# Patient Record
Sex: FEMALE | Race: BLACK OR AFRICAN AMERICAN | NOT HISPANIC OR LATINO
[De-identification: names, ages, dates, MRNs, and addresses within clinical notes are randomized per-mention and may not be internally consistent; named-entity substitution may affect disease eponyms.]

---

## 2018-04-06 ENCOUNTER — APPOINTMENT (OUTPATIENT)
Dept: ORTHOPEDIC SURGERY | Facility: CLINIC | Age: 83
End: 2018-04-06

## 2019-12-09 ENCOUNTER — INPATIENT (INPATIENT)
Facility: HOSPITAL | Age: 84
LOS: 1 days | Discharge: HOME | DRG: 313 | End: 2019-12-11
Attending: INTERNAL MEDICINE | Admitting: INTERNAL MEDICINE
Payer: COMMERCIAL

## 2019-12-09 VITALS — TEMPERATURE: 99 F | WEIGHT: 149.91 LBS | OXYGEN SATURATION: 98 % | HEART RATE: 75 BPM | RESPIRATION RATE: 16 BRPM

## 2019-12-09 DIAGNOSIS — R07.9 CHEST PAIN, UNSPECIFIED: ICD-10-CM

## 2019-12-09 LAB
ALBUMIN SERPL ELPH-MCNC: 3.8 G/DL — SIGNIFICANT CHANGE UP (ref 3.5–5)
ALP SERPL-CCNC: 46 U/L — SIGNIFICANT CHANGE UP (ref 40–120)
ALT FLD-CCNC: 22 U/L DA — SIGNIFICANT CHANGE UP (ref 10–60)
ANION GAP SERPL CALC-SCNC: 6 MMOL/L — SIGNIFICANT CHANGE UP (ref 5–17)
AST SERPL-CCNC: 16 U/L — SIGNIFICANT CHANGE UP (ref 10–40)
BILIRUB SERPL-MCNC: 0.3 MG/DL — SIGNIFICANT CHANGE UP (ref 0.2–1.2)
BUN SERPL-MCNC: 13 MG/DL — SIGNIFICANT CHANGE UP (ref 7–18)
CALCIUM SERPL-MCNC: 9 MG/DL — SIGNIFICANT CHANGE UP (ref 8.4–10.5)
CHLORIDE SERPL-SCNC: 108 MMOL/L — SIGNIFICANT CHANGE UP (ref 96–108)
CO2 SERPL-SCNC: 25 MMOL/L — SIGNIFICANT CHANGE UP (ref 22–31)
CREAT SERPL-MCNC: 1.03 MG/DL — SIGNIFICANT CHANGE UP (ref 0.5–1.3)
GLUCOSE SERPL-MCNC: 99 MG/DL — SIGNIFICANT CHANGE UP (ref 70–99)
HCT VFR BLD CALC: 37.2 % — SIGNIFICANT CHANGE UP (ref 34.5–45)
HGB BLD-MCNC: 11.9 G/DL — SIGNIFICANT CHANGE UP (ref 11.5–15.5)
MCHC RBC-ENTMCNC: 30.7 PG — SIGNIFICANT CHANGE UP (ref 27–34)
MCHC RBC-ENTMCNC: 32 GM/DL — SIGNIFICANT CHANGE UP (ref 32–36)
MCV RBC AUTO: 95.9 FL — SIGNIFICANT CHANGE UP (ref 80–100)
NRBC # BLD: 0 /100 WBCS — SIGNIFICANT CHANGE UP (ref 0–0)
NT-PROBNP SERPL-SCNC: 266 PG/ML — SIGNIFICANT CHANGE UP (ref 0–450)
PLATELET # BLD AUTO: 300 K/UL — SIGNIFICANT CHANGE UP (ref 150–400)
POTASSIUM SERPL-MCNC: 4.1 MMOL/L — SIGNIFICANT CHANGE UP (ref 3.5–5.3)
POTASSIUM SERPL-SCNC: 4.1 MMOL/L — SIGNIFICANT CHANGE UP (ref 3.5–5.3)
PROT SERPL-MCNC: 7.2 G/DL — SIGNIFICANT CHANGE UP (ref 6–8.3)
RBC # BLD: 3.88 M/UL — SIGNIFICANT CHANGE UP (ref 3.8–5.2)
RBC # FLD: 14.5 % — SIGNIFICANT CHANGE UP (ref 10.3–14.5)
SODIUM SERPL-SCNC: 139 MMOL/L — SIGNIFICANT CHANGE UP (ref 135–145)
TROPONIN I SERPL-MCNC: <0.015 NG/ML — SIGNIFICANT CHANGE UP (ref 0–0.04)
TROPONIN I SERPL-MCNC: <0.015 NG/ML — SIGNIFICANT CHANGE UP (ref 0–0.04)
WBC # BLD: 8.44 K/UL — SIGNIFICANT CHANGE UP (ref 3.8–10.5)
WBC # FLD AUTO: 8.44 K/UL — SIGNIFICANT CHANGE UP (ref 3.8–10.5)

## 2019-12-09 PROCEDURE — 99285 EMERGENCY DEPT VISIT HI MDM: CPT

## 2019-12-09 PROCEDURE — 71046 X-RAY EXAM CHEST 2 VIEWS: CPT | Mod: 26

## 2019-12-09 PROCEDURE — 99222 1ST HOSP IP/OBS MODERATE 55: CPT

## 2019-12-09 NOTE — H&P ADULT - NSHPPHYSICALEXAM_GEN_ALL_CORE
Vital Signs Last 24 Hrs  T(C): 36.6 (10 Dec 2019 05:20), Max: 37.5 (09 Dec 2019 19:51)  T(F): 97.9 (10 Dec 2019 05:20), Max: 99.5 (09 Dec 2019 19:51)  HR: 58 (10 Dec 2019 05:20) (58 - 75)  BP: 118/53 (10 Dec 2019 05:20) (118/53 - 142/67)  BP(mean): --  RR: 16 (10 Dec 2019 05:20) (16 - 16)  SpO2: 100% (10 Dec 2019 05:20) (98% - 100%)

## 2019-12-09 NOTE — H&P ADULT - PROBLEM SELECTOR PLAN 1
p/w atypical, bilateral chest pain  EKG NSR, trop negative x2  f/u t3  tele monitoring  f/u TTE  starting asa, statin, bblocker  cardio consulted - Dr. Hurley

## 2019-12-09 NOTE — H&P ADULT - HISTORY OF PRESENT ILLNESS
87 y/o female with PMHx of HTN, spinal stenosis and hypothyroidism presents to the ED with chief complaint of chest pain that began this morning. She describes the pain as constant, bilateral and improved at present. At its worst, she says it was 9/10 in severity. She is unable to provide further descriptives of the pain itself. She does say that she thinks its pleuritic. She also complaints of occasional shortness of breath even at rest. She has no other complaints at this time.

## 2019-12-09 NOTE — H&P ADULT - ASSESSMENT
85 y/o female admitted to tele for chest pain with concern for ACS.    Patient cannot recall home meds. Primary team to call pharmacy for med rec.

## 2019-12-09 NOTE — H&P ADULT - ATTENDING COMMENTS
Pt seen and examined at bedside. Discuss with housestaff.    86 year old woman with PMH of HTN, previous work up for CAD after an abnormal stress test with cardiac cath (11/2016) showing  "VENTRICLES: EF estimated was 60 %.CORONARY VESSELS: The coronary circulation is left dominant.  LM:   --  LM: Normal.  LAD:   --  LAD: Normal.  --  D1: Normal.  --  D2: Normal.  CX:   --  Circumflex: Normal.  --  OM1: Normal.  RCA:   --  RCA: Normal.    She has been managed with aggressive medical therapy since then. She comes in today with CP    Vital Signs Last 24 Hrs  T(C): 37.5 (09 Dec 2019 19:51), Max: 37.5 (09 Dec 2019 19:51)  T(F): 99.5 (09 Dec 2019 19:51), Max: 99.5 (09 Dec 2019 19:51)  HR: 63 (09 Dec 2019 19:51) (63 - 75)  BP: 135/60 (09 Dec 2019 19:51) (135/60 - 135/60)  RR: 16 (09 Dec 2019 19:51) (16 - 16)  SpO2: 99% (09 Dec 2019 19:51) (98% - 99%) Pt seen and examined at bedside. Discuss with housestaff.    86 year old woman with PMH of HTN, previous work up for CAD after an abnormal stress test with cardiac cath (11/2016) showing normal exam with recommendations for medical management. She presents with a few days of chest pain allover the right and left chest. There is no referral. Associated progressive SOB with exercise intolerance. Unable to really detail this as it occurs in the background of chronic low back pain and B/L knee osteoarthritis. No orthopnea or PND.    Vital Signs Last 24 Hrs  T(C): 36.9 (10 Dec 2019 00:39), Max: 37.5 (09 Dec 2019 19:51)  T(F): 98.4 (10 Dec 2019 00:39), Max: 99.5 (09 Dec 2019 19:51)  HR: 63 (10 Dec 2019 00:39) (63 - 75)  BP: 142/67 (10 Dec 2019 00:39) (135/60 - 142/67)  RR: 16 (10 Dec 2019 00:39) (16 - 16)  SpO2: 100% (10 Dec 2019 00:39) (98% - 100%)    Elderly woman, lying in bed, NAD AAO X 3  CTA B/L RRR S1S2 only  CP is not reproducible  Soft NT ND BS +  No pedal edema, no focal deficits    Labs                        11.9   8.44  )-----------( 300      ( 09 Dec 2019 17:25 )             37.2     12-09    139  |  108  |  13  -----------------------<  99  4.1   |  25  |  1.03    Ca    9.0      09 Dec 2019 17:25    TPro  7.2  /  Alb  3.8  /  TBili  0.3  /  DBili  x   /  AST  16  /  ALT  22  /  AlkPhos  46  12-09    CARDIAC MARKERS ( 09 Dec 2019 21:04 )  <0.015 ng/mL / x     / x     / x     / x      CARDIAC MARKERS ( 09 Dec 2019 17:25 )  <0.015 ng/mL / x     / x     / x     / x        EKG - unremarkable  OLD cath report  "VENTRICLES: EF estimated was 60 %.CORONARY VESSELS: The coronary circulation is left dominant.  LM:   --  LM: Normal.  LAD:   --  LAD: Normal.  --  D1: Normal.  --  D2: Normal.  CX:   --  Circumflex: Normal.  --  OM1: Normal.  RCA:   --  RCA: Normal.  She has been managed with aggressive medical therapy since then.    Impression  86 year old woman with PMH as above presenting with atypical CP and progressive SOB.  WIll admit for ACS r/o and CHF work up.  Low HEART and SOLE score.    A/P  R/O ACS  CHF work up    Plan  Admit to tele  Serial cardiac enzymes  EKG  ASA/BB/ASA  Stress test  Cardiology consult

## 2019-12-09 NOTE — ED ADULT NURSE NOTE - NSIMPLEMENTINTERV_GEN_ALL_ED
Implemented All Fall Risk Interventions:  [place] to call system. Call bell, personal items and telephone within reach. Instruct patient to call for assistance. Room bathroom lighting operational. Non-slip footwear when patient is off stretcher. Physically safe environment: no spills, clutter or unnecessary equipment. Stretcher in lowest position, wheels locked, appropriate side rails in place. Provide visual cue, wrist band, yellow gown, etc. Monitor gait and stability. Monitor for mental status changes and reorient to person, place, and time. Review medications for side effects contributing to fall risk. Reinforce activity limits and safety measures with patient and family.

## 2019-12-09 NOTE — ED ADULT NURSE NOTE - OBJECTIVE STATEMENT
Pt BIBA after complaints of chest pain.   Pt states she has pain while breathing that started today/AM. Pain scale 5/10

## 2019-12-09 NOTE — ED PROVIDER NOTE - OBJECTIVE STATEMENT
86 year old female with PMHx of hypertension, hyperlipidemia, and hypothyroidism and no pertinent PSHx presents to the ED with complaints of bilateral chest pain and trouble breathing for a few hours since this morning. Patient reports that the chest pain does not radiate to her arms, and is worsened with lying flat. Patient states that she has associated shortness of breath with her chest pain. Patient otherwise denies any diaphoresis, nausea, light-headedness, swelling, and all other acute complaints. Patient reports that she does not have any family history of similar symptoms, and does not have any personal history of blood clots, cancer, MI, or strokes. NKDA.

## 2019-12-09 NOTE — ED ADULT NURSE NOTE - CHPI ED NUR SYMPTOMS NEG
no dizziness/no fever/no weakness/no tingling/no vomiting/no decreased eating/drinking/no nausea/no chills

## 2019-12-10 DIAGNOSIS — E03.9 HYPOTHYROIDISM, UNSPECIFIED: ICD-10-CM

## 2019-12-10 DIAGNOSIS — I10 ESSENTIAL (PRIMARY) HYPERTENSION: ICD-10-CM

## 2019-12-10 DIAGNOSIS — Z29.9 ENCOUNTER FOR PROPHYLACTIC MEASURES, UNSPECIFIED: ICD-10-CM

## 2019-12-10 DIAGNOSIS — R07.9 CHEST PAIN, UNSPECIFIED: ICD-10-CM

## 2019-12-10 LAB
ALBUMIN SERPL ELPH-MCNC: 3.4 G/DL — LOW (ref 3.5–5)
ALP SERPL-CCNC: 39 U/L — LOW (ref 40–120)
ALT FLD-CCNC: 24 U/L DA — SIGNIFICANT CHANGE UP (ref 10–60)
ANION GAP SERPL CALC-SCNC: 4 MMOL/L — LOW (ref 5–17)
AST SERPL-CCNC: 16 U/L — SIGNIFICANT CHANGE UP (ref 10–40)
BASOPHILS # BLD AUTO: 0.05 K/UL — SIGNIFICANT CHANGE UP (ref 0–0.2)
BASOPHILS NFR BLD AUTO: 0.7 % — SIGNIFICANT CHANGE UP (ref 0–2)
BILIRUB SERPL-MCNC: 0.4 MG/DL — SIGNIFICANT CHANGE UP (ref 0.2–1.2)
BUN SERPL-MCNC: 12 MG/DL — SIGNIFICANT CHANGE UP (ref 7–18)
CALCIUM SERPL-MCNC: 9 MG/DL — SIGNIFICANT CHANGE UP (ref 8.4–10.5)
CHLORIDE SERPL-SCNC: 111 MMOL/L — HIGH (ref 96–108)
CHOLEST SERPL-MCNC: 173 MG/DL — SIGNIFICANT CHANGE UP (ref 10–199)
CO2 SERPL-SCNC: 27 MMOL/L — SIGNIFICANT CHANGE UP (ref 22–31)
CREAT SERPL-MCNC: 0.86 MG/DL — SIGNIFICANT CHANGE UP (ref 0.5–1.3)
EOSINOPHIL # BLD AUTO: 0.03 K/UL — SIGNIFICANT CHANGE UP (ref 0–0.5)
EOSINOPHIL NFR BLD AUTO: 0.4 % — SIGNIFICANT CHANGE UP (ref 0–6)
FOLATE SERPL-MCNC: 20 NG/ML — SIGNIFICANT CHANGE UP
GLUCOSE SERPL-MCNC: 100 MG/DL — HIGH (ref 70–99)
HBA1C BLD-MCNC: 5.9 % — HIGH (ref 4–5.6)
HCT VFR BLD CALC: 37.1 % — SIGNIFICANT CHANGE UP (ref 34.5–45)
HDLC SERPL-MCNC: 79 MG/DL — SIGNIFICANT CHANGE UP
HGB BLD-MCNC: 11.8 G/DL — SIGNIFICANT CHANGE UP (ref 11.5–15.5)
IMM GRANULOCYTES NFR BLD AUTO: 0.7 % — SIGNIFICANT CHANGE UP (ref 0–1.5)
LIPID PNL WITH DIRECT LDL SERPL: 78 MG/DL — SIGNIFICANT CHANGE UP
LYMPHOCYTES # BLD AUTO: 1.79 K/UL — SIGNIFICANT CHANGE UP (ref 1–3.3)
LYMPHOCYTES # BLD AUTO: 25 % — SIGNIFICANT CHANGE UP (ref 13–44)
MAGNESIUM SERPL-MCNC: 2 MG/DL — SIGNIFICANT CHANGE UP (ref 1.6–2.6)
MCHC RBC-ENTMCNC: 30.3 PG — SIGNIFICANT CHANGE UP (ref 27–34)
MCHC RBC-ENTMCNC: 31.8 GM/DL — LOW (ref 32–36)
MCV RBC AUTO: 95.1 FL — SIGNIFICANT CHANGE UP (ref 80–100)
MONOCYTES # BLD AUTO: 0.72 K/UL — SIGNIFICANT CHANGE UP (ref 0–0.9)
MONOCYTES NFR BLD AUTO: 10.1 % — SIGNIFICANT CHANGE UP (ref 2–14)
NEUTROPHILS # BLD AUTO: 4.51 K/UL — SIGNIFICANT CHANGE UP (ref 1.8–7.4)
NEUTROPHILS NFR BLD AUTO: 63.1 % — SIGNIFICANT CHANGE UP (ref 43–77)
NRBC # BLD: 0 /100 WBCS — SIGNIFICANT CHANGE UP (ref 0–0)
PHOSPHATE SERPL-MCNC: 2.3 MG/DL — LOW (ref 2.5–4.5)
PLATELET # BLD AUTO: 292 K/UL — SIGNIFICANT CHANGE UP (ref 150–400)
POTASSIUM SERPL-MCNC: 3.7 MMOL/L — SIGNIFICANT CHANGE UP (ref 3.5–5.3)
POTASSIUM SERPL-SCNC: 3.7 MMOL/L — SIGNIFICANT CHANGE UP (ref 3.5–5.3)
PROT SERPL-MCNC: 6.3 G/DL — SIGNIFICANT CHANGE UP (ref 6–8.3)
RBC # BLD: 3.9 M/UL — SIGNIFICANT CHANGE UP (ref 3.8–5.2)
RBC # FLD: 14.7 % — HIGH (ref 10.3–14.5)
SODIUM SERPL-SCNC: 142 MMOL/L — SIGNIFICANT CHANGE UP (ref 135–145)
TOTAL CHOLESTEROL/HDL RATIO MEASUREMENT: 2.2 RATIO — LOW (ref 3.3–7.1)
TRIGL SERPL-MCNC: 79 MG/DL — SIGNIFICANT CHANGE UP (ref 10–149)
TROPONIN I SERPL-MCNC: <0.015 NG/ML — SIGNIFICANT CHANGE UP (ref 0–0.04)
TSH SERPL-MCNC: 2.28 UU/ML — SIGNIFICANT CHANGE UP (ref 0.34–4.82)
VIT B12 SERPL-MCNC: 447 PG/ML — SIGNIFICANT CHANGE UP (ref 232–1245)
WBC # BLD: 7.15 K/UL — SIGNIFICANT CHANGE UP (ref 3.8–10.5)
WBC # FLD AUTO: 7.15 K/UL — SIGNIFICANT CHANGE UP (ref 3.8–10.5)

## 2019-12-10 PROCEDURE — 93016 CV STRESS TEST SUPVJ ONLY: CPT

## 2019-12-10 PROCEDURE — 93018 CV STRESS TEST I&R ONLY: CPT

## 2019-12-10 PROCEDURE — 78452 HT MUSCLE IMAGE SPECT MULT: CPT | Mod: 26

## 2019-12-10 PROCEDURE — 99233 SBSQ HOSP IP/OBS HIGH 50: CPT | Mod: GC

## 2019-12-10 RX ORDER — ASPIRIN/CALCIUM CARB/MAGNESIUM 324 MG
81 TABLET ORAL DAILY
Refills: 0 | Status: DISCONTINUED | OUTPATIENT
Start: 2019-12-10 | End: 2019-12-11

## 2019-12-10 RX ORDER — ENOXAPARIN SODIUM 100 MG/ML
40 INJECTION SUBCUTANEOUS DAILY
Refills: 0 | Status: DISCONTINUED | OUTPATIENT
Start: 2019-12-10 | End: 2019-12-11

## 2019-12-10 RX ORDER — AMLODIPINE BESYLATE 2.5 MG/1
5 TABLET ORAL DAILY
Refills: 0 | Status: DISCONTINUED | OUTPATIENT
Start: 2019-12-10 | End: 2019-12-11

## 2019-12-10 RX ORDER — ATORVASTATIN CALCIUM 80 MG/1
40 TABLET, FILM COATED ORAL AT BEDTIME
Refills: 0 | Status: DISCONTINUED | OUTPATIENT
Start: 2019-12-10 | End: 2019-12-11

## 2019-12-10 RX ORDER — LEVOTHYROXINE SODIUM 125 MCG
75 TABLET ORAL DAILY
Refills: 0 | Status: DISCONTINUED | OUTPATIENT
Start: 2019-12-10 | End: 2019-12-11

## 2019-12-10 RX ORDER — METOPROLOL TARTRATE 50 MG
12.5 TABLET ORAL
Refills: 0 | Status: DISCONTINUED | OUTPATIENT
Start: 2019-12-10 | End: 2019-12-10

## 2019-12-10 RX ADMIN — Medication 81 MILLIGRAM(S): at 11:55

## 2019-12-10 RX ADMIN — Medication 12.5 MILLIGRAM(S): at 17:12

## 2019-12-10 RX ADMIN — ENOXAPARIN SODIUM 40 MILLIGRAM(S): 100 INJECTION SUBCUTANEOUS at 11:55

## 2019-12-10 RX ADMIN — AMLODIPINE BESYLATE 5 MILLIGRAM(S): 2.5 TABLET ORAL at 18:10

## 2019-12-10 RX ADMIN — ATORVASTATIN CALCIUM 40 MILLIGRAM(S): 80 TABLET, FILM COATED ORAL at 21:03

## 2019-12-10 NOTE — CONSULT NOTE ADULT - SUBJECTIVE AND OBJECTIVE BOX
CHIEF COMPLAINT:Chest Pain    HPI:85 y/o female with PMHx of HTN, spinal stenosis and hypothyroidism presents to the ED with chief complaint of chest pain that began this morning. She describes the pain as constant, bilateral and improved at present. At its worst, she says it was 9/10 in severity. She is unable to provide further descriptives of the pain itself. She does say that she thinks its pleuritic. She also complaints of occasional shortness of breath even at rest. She has no other complaints at this time.     PAST MEDICAL & SURGICAL HISTORY:  Hypothyroidism  HTN (hypertension)  No significant past surgical history      MEDICATIONS  (STANDING):  aspirin  chewable 81 milliGRAM(s) Oral daily  atorvastatin 40 milliGRAM(s) Oral at bedtime  enoxaparin Injectable 40 milliGRAM(s) SubCutaneous daily  levothyroxine 75 MICROGram(s) Oral daily  metoprolol tartrate 12.5 milliGRAM(s) Oral two times a day          FAMILY HISTORY:  Family history of heart attack: mother  MI 76yo  No family history of premature coronary artery disease or sudden cardiac death    SOCIAL HISTORY:  Smoking-Non Smoker  Alcohol-Denies  Ilicit Drug use-Denies    REVIEW OF SYSTEMS:  Constitutional: [ ] fever, [ ]weight loss, [x ]fatigue Activity [ ] Bedbound,[x ] Ambulates [x ] Unassisted[ ] Cane/Walker [ ] Assistence.  Eyes: [ ] visual changes[x] jaw pain  Respiratory: [ ]shortness of breath;  [ ] cough, [ ]wheezing, [ ]chills, [ ]hemoptysis  Cardiovascular: [x ] chest pain, [ ]palpitations, [ ]dizziness,  [ ]leg swelling[ ]orthopnea [ ]PND  Gastrointestinal: [ ] abdominal pain, [ ]nausea, [ ]vomiting,  [ ]diarrhea,[ ]constipation  Genitourinary: [ ] dysuria, [ ] hematuria  Neurologic: [ ] headaches [ ] tremors[ ] weakness  Skin: [ ] itching, [ ]burning, [ ] rashes  Endocrine: [ ] heat or cold intolerance  Musculoskeletal: [ ] joint pain or swelling; [ ] muscle, back, or extremity pain  Psychiatric: [ ] depression, [ ]anxiety, [ ]mood swings, or [ ]difficulty sleeping  Hematologic: [ ] easy bruising, [ ] bleeding gums       [ x] All others negative	  [ ] Unable to obtain    Vital Signs Last 24 Hrs  T(C): 36.8 (10 Dec 2019 07:55), Max: 37.5 (09 Dec 2019 19:51)  T(F): 98.3 (10 Dec 2019 07:55), Max: 99.5 (09 Dec 2019 19:51)  HR: 67 (10 Dec 2019 07:55) (58 - 75)  BP: 136/62 (10 Dec 2019 07:55) (118/53 - 142/67)  RR: 18 (10 Dec 2019 07:55) (16 - 18)  SpO2: 100% (10 Dec 2019 07:55) (98% - 100%)  I&O's Summary      PHYSICAL EXAM:  General: No acute distress BMI-36  HEENT: EOMI, PERRL[ ] Icteric  Neck: Supple, No JVD  Lungs: Equal air entry bilaterally; [ ] Rales [ ] Rhonchi [ ] Wheezing  Heart: Regular rate and rhythm;[x ] Murmurs-   2/6 [x ] Systolic [ ] Diastolic [ ] Radiation,No rubs, or gallops  Abdomen: Nontender, bowel sounds present  Extremities: No clubbing, cyanosis, or edema[ ] Calf tenderness  Nervous system:  Alert & Oriented X3, no focal deficits  Psychiatric: Normal affect  Skin: No rashes or lesions      LABS:  12-10    142  |  111<H>  |  12  ----------------------------<  100<H>  3.7   |  27  |  0.86    Ca    9.0      10 Dec 2019 07:51  Phos  2.3     12-10  Mg     2.0     12-10    TPro  6.3  /  Alb  3.4<L>  /  TBili  0.4  /  DBili  x   /  AST  16  /  ALT  24  /  AlkPhos  39<L>  12-10    Creatinine Trend: 0.86<--, 1.03<--                        11.8   7.15  )-----------( 292      ( 10 Dec 2019 07:51 )             37.1         Lipid Panel: Cholesterol, Serum 173  Direct LDL 78  HDL Cholesterol, Serum 79  Triglycerides, Serum 79    Cardiac Enzymes: CARDIAC MARKERS ( 10 Dec 2019 07:51 )  <0.015 ng/mL / x     / x     / x     / x      CARDIAC MARKERS ( 09 Dec 2019 21:04 )  <0.015 ng/mL / x     / x     / x     / x      CARDIAC MARKERS ( 09 Dec 2019 17:25 )  <0.015 ng/mL / x     / x     / x     / x          Serum Pro-Brain Natriuretic Peptide: 266 pg/mL (19 @ 19:12)  Serum Pro-Brain Natriuretic Peptide: 287 pg/mL (19 @ 17:49)        RADIOLOGY:<    XR CHEST PA LAT  IMPRESSION: No evidence for focal infiltrate or lobar consolidation.        ECG [my interpretation]:Sinus Rhythm no acute ST T wave abnormalities

## 2019-12-10 NOTE — PROGRESS NOTE ADULT - PROBLEM SELECTOR PLAN 1
-p/w atypical chest pain   -Trop x3 negative   -12 lead EKG with no acute changes   -Cardio Dr. Quiros   -for stress test tomorrow -p/w atypical chest pain   -Trop x3 negative   -12 lead EKG with no acute changes   -cont Tele monitoring   -Cardio Dr. Quiros   -for stress test tomorrow

## 2019-12-10 NOTE — PROGRESS NOTE ADULT - ATTENDING COMMENTS
Patient seen and examined; Agree with NP A/P above with editing as needed. My independent assessment, findings on exam, diagnosis and plan of care as listed below. Discussed with NP [place].    Vital Signs Last 24 Hrs  T(C): 36.8 (10 Dec 2019 15:30), Max: 37.5 (09 Dec 2019 19:51)  T(F): 98.3 (10 Dec 2019 15:30), Max: 99.5 (09 Dec 2019 19:51)  HR: 82 (10 Dec 2019 15:30) (58 - 82)  BP: 116/71 (10 Dec 2019 15:30) (116/71 - 142/67)  BP(mean): 82 (10 Dec 2019 15:30) (82 - 82)  RR: 18 (10 Dec 2019 15:30) (16 - 18)  SpO2: 100% (10 Dec 2019 15:30) (99% - 100%)    P/E;  Neuro: AAO x3; No focal neurological deficits  CVS: s1S2 present, Regular  Resp: BLAE+, NO wheeze or Rhonchi  GI: Soft, BS+, NT  Extr: No edema or calf tenderness    Labs:                        11.8   7.15  )-----------( 292      ( 10 Dec 2019 07:51 )             37.1  12-10    142  |  111<H>  |  12  ----------------------------<  100<H>  3.7   |  27  |  0.86    Ca    9.0      10 Dec 2019 07:51  Phos  2.3     12-10  Mg     2.0     12-10    TPro  6.3  /  Alb  3.4<L>  /  TBili  0.4  /  DBili  x   /  AST  16  /  ALT  24  /  AlkPhos  39<L>  12-10    D/D:  Chest pain and shortness of breath concerning for ischemic heart disease  Likely Anxiety state from Psychosocial stressors  Hx HTN controlled Patient seen and examined; Agree with NP A/P above with editing as needed. My independent assessment, findings on exam, diagnosis and plan of care as listed below. Discussed with XIOMARA Mccormack.    Patient admitted with chest pain and shortness of breath with exertion. No chest pain this afternoon but did feel short of breath on ambulating to rest room. No nausea or vomit. On 3 anti HTN at home (Amlodipine 5, Triamterene-HCTZ 37/12.5 and Losartan 100 mg daily). BP well controlled with tiny dose of Metoprolol here placed for ACS protocol    Vital Signs Last 24 Hrs  T(C): 36.8 (10 Dec 2019 15:30), Max: 37.5 (09 Dec 2019 19:51)  T(F): 98.3 (10 Dec 2019 15:30), Max: 99.5 (09 Dec 2019 19:51)  HR: 82 (10 Dec 2019 15:30) (58 - 82)  BP: 116/71 (10 Dec 2019 15:30) (116/71 - 142/67)  BP(mean): 82 (10 Dec 2019 15:30) (82 - 82)  RR: 18 (10 Dec 2019 15:30) (16 - 18)  SpO2: 100% (10 Dec 2019 15:30) (99% - 100%)    P/E;  Neuro: AAO x3; No focal neurological deficits  CVS: s1S2 present, Regular  Resp: BLAE+, NO wheeze or Rhonchi  GI: Soft, BS+, NT  Extr: No edema or calf tenderness    Labs:                        11.8   7.15  )-----------( 292      ( 10 Dec 2019 07:51 )             37.1  12-10    142  |  111<H>  |  12  ----------------------------<  100<H>  3.7   |  27  |  0.86    Ca    9.0      10 Dec 2019 07:51  Phos  2.3     12-10  Mg     2.0     12-10    TPro  6.3  /  Alb  3.4<L>  /  TBili  0.4  /  DBili  x   /  AST  16  /  ALT  24  /  AlkPhos  39<L>  12-10    D/D:  Chest pain and shortness of breath concerning for ischemic heart disease  Likely Anxiety state from Psychosocial stressors  Hx HTN controlled Patient seen and examined; Agree with NP A/P above with editing as needed. My independent assessment, findings on exam, diagnosis and plan of care as listed below. Discussed with XIOMARA Mccormack.    Patient admitted with chest pain and shortness of breath with exertion. No chest pain this afternoon but did feel short of breath on ambulating to rest room. No nausea or vomit. On 3 anti HTN at home (Amlodipine 5, Triamterene-HCTZ 37/12.5 and Losartan 100 mg daily). BP well controlled with tiny dose of Metoprolol here placed for ACS protocol    Vital Signs Last 24 Hrs  T(C): 36.8 (10 Dec 2019 15:30), Max: 37.5 (09 Dec 2019 19:51)  T(F): 98.3 (10 Dec 2019 15:30), Max: 99.5 (09 Dec 2019 19:51)  HR: 82 (10 Dec 2019 15:30) (58 - 82)  BP: 116/71 (10 Dec 2019 15:30) (116/71 - 142/67)  BP(mean): 82 (10 Dec 2019 15:30) (82 - 82)  RR: 18 (10 Dec 2019 15:30) (16 - 18)  SpO2: 100% (10 Dec 2019 15:30) (99% - 100%)    P/E;  Neuro: AAO x3; No focal neurological deficits  CVS: s1S2 present, Regular  Resp: BLAE+, NO wheeze or Rhonchi  GI: Soft, BS+, NT  Extr: No edema or calf tenderness    Labs:                        11.8   7.15  )-----------( 292      ( 10 Dec 2019 07:51 )             37.1  12-10    142  |  111<H>  |  12  ----------------------------<  100<H>  3.7   |  27  |  0.86    Ca    9.0      10 Dec 2019 07:51  Phos  2.3     12-10  Mg     2.0     12-10    TPro  6.3  /  Alb  3.4<L>  /  TBili  0.4  /  DBili  x   /  AST  16  /  ALT  24  /  AlkPhos  39<L>  12-10    D/D:  Chest pain and shortness of breath concerning for ischemic heart disease  Likely Anxiety state from Psychosocial stressors  Hx HTN controlled  Hx Hypothyroidism adequately Rx  Plan:  Tele; Follow up Echo  Stress test in AM; d/w Patient no coffee, tea or soda in AM   BP controlled; on three anti HTN at home  Resume Amlodipine; No ACS so D/C Metoprolol;  Hold off Losartan and Triamterene-HCTZ at this time; Will resume as clinically appropriate  Continue Synthroid; TSH acceptable; Repeat every 3-6 months  D/C Home tomorrow if stress test negative and Echo insignificant    Discussed with patient findings and plan of care  Discussed with XIOMARA Amaya and SUDHIR Carty

## 2019-12-10 NOTE — PATIENT PROFILE ADULT - IS THERE A SUSPICION OF ABUSE/NEGLIGENCE?
Ventricular Rate : 73   Atrial Rate : 73   P-R Interval : 174   QRS Duration : 80   Q-T Interval : 388   QTC Calculation(Bezet) : 427   P Axis : 71   R Axis : 72   T Axis : 67   Diagnosis : Normal sinus rhythm~Normal ECG~When compared with ECG of 24-MAY-2017 12:07,~No significant change was found~Confirmed by JARED HERNANDEZ ([id number]) on 5/27/2017 1:54:22 AM     
no

## 2019-12-10 NOTE — CONSULT NOTE ADULT - ASSESSMENT
85 y/o female admitted to [facility] for chest pain with concern for ACS.      Problem/Plan - 1:  ·  Problem: Chest pain.  Plan: p/w atypical, bilateral chest pain  EKG NSR,   Trop negative x3  Ischemic work-up-NST today    Problem/Plan - 2:  ·  Problem: Hypothyroidism.  Plan: c/w synthroid  -Thyroid Stimulating Hormone, Serum (12.10.19 @ 07:51)    Thyroid Stimulating Hormone, Serum: 2.28 uU/mL        Problem/Plan - 3:  ·  Problem: HTN (hypertension).  Plan: bp relatively wnl at this time  low dose bblocker per acs protocol only for now  monitor vitals.

## 2019-12-10 NOTE — PROGRESS NOTE ADULT - SUBJECTIVE AND OBJECTIVE BOX
85 y/o female with PMHx of HTN, spinal stenosis and hypothyroidism presents to the ED with chief complaint of chest pain, admitted to rule out ACS. troponis x3 negative  followed by cardio Dr. Quiros followed, for stress test tomorrow      OVERNIGHT EVENTS: +ve intermittent chest pain     REVIEW OF SYSTEMS:  CONSTITUTIONAL: No fever, chills  NECK: No pain or stiffness  RESPIRATORY: No cough, SOB  CARDIOVASCULAR: intermittent chest pain, no palpitations  GASTROINTESTINAL: No abdominal pain. No nausea, vomiting, or diarrhea  GENITOURINARY: No dysuria  NEUROLOGICAL: No HA  MUSCULOSKELETAL: No joint pain or swelling; No muscle, back, or extremity pain    T(C): 36.7 (12-10-19 @ 11:05), Max: 37.5 (12-09-19 @ 19:51)  HR: 71 (12-10-19 @ 11:05) (58 - 75)  BP: 122/61 (12-10-19 @ 11:05) (118/53 - 142/67)  RR: 17 (12-10-19 @ 11:05) (16 - 18)  SpO2: 100% (12-10-19 @ 11:05) (98% - 100%)  Wt(kg): --Vital Signs Last 24 Hrs  T(C): 36.7 (10 Dec 2019 11:05), Max: 37.5 (09 Dec 2019 19:51)  T(F): 98.1 (10 Dec 2019 11:05), Max: 99.5 (09 Dec 2019 19:51)  HR: 71 (10 Dec 2019 11:05) (58 - 75)  BP: 122/61 (10 Dec 2019 11:05) (118/53 - 142/67)  BP(mean): --  RR: 17 (10 Dec 2019 11:05) (16 - 18)  SpO2: 100% (10 Dec 2019 11:05) (98% - 100%)    MEDICATIONS  (STANDING):  aspirin  chewable 81 milliGRAM(s) Oral daily  atorvastatin 40 milliGRAM(s) Oral at bedtime  enoxaparin Injectable 40 milliGRAM(s) SubCutaneous daily  levothyroxine 75 MICROGram(s) Oral daily  metoprolol tartrate 12.5 milliGRAM(s) Oral two times a day    MEDICATIONS  (PRN):      PHYSICAL EXAM:  GENERAL: NAD  ENMT: Moist mucous membranes  NECK: Supple, No JVD  CHEST/LUNG: Clear to auscultation bilaterally; No rales, rhonchi, wheezing, or rubs  HEART: S1, S2, Regular rate and rhythm  ABDOMEN: Soft, Nontender, Nondistended; Bowel sounds present  NEURO: Alert & Oriented X3  EXTREMITIES: No LE edema, no calf tenderness    Consultant(s) Notes Reviewed:  [x ] YES  [ ] NO  Care Discussed with Consultants/Other Providers [ x] YES  [ ] NO    LABS:                        11.8   7.15  )-----------( 292      ( 10 Dec 2019 07:51 )             37.1     12-10    142  |  111<H>  |  12  ----------------------------<  100<H>  3.7   |  27  |  0.86    Ca    9.0      10 Dec 2019 07:51  Phos  2.3     12-10  Mg     2.0     12-10    TPro  6.3  /  Alb  3.4<L>  /  TBili  0.4  /  DBili  x   /  AST  16  /  ALT  24  /  AlkPhos  39<L>  12-10        CAPILLARY BLOOD GLUCOSE      POCT Blood Glucose.: 105 mg/dL (09 Dec 2019 17:06)    RADIOLOGY & ADDITIONAL TESTS:    < from: Xray Chest 2 Views PA/Lat (12.09.19 @ 20:05) >    EXAM:  XR CHEST PA LAT 2V                            PROCEDURE DATE:  12/09/2019          INTERPRETATION:  INDICATION: Chest Pain    PRIORS: None    VIEWS: Frontal and lateral radiographs of the chest performed.    FINDINGS: Heart size appears within normal limits. No hilar or superior   mediastinal abnormalities are identified.    Arterial deficiency is suggested within the upper lung fields, likely   related to COPD/emphysema. There is no evidence for focal infiltrate,   lobar consolidation or pulmonary edema. No pleural effusion or   pneumothorax is demonstrated. No mediastinal shift is noted. Degenerative   changes of the thoracic spine noted.    IMPRESSION: No evidence for focal infiltrate or lobar consolidation.      < end of copied text >      Imaging Personally Reviewed:  [ ] YES  [ ] NO

## 2019-12-10 NOTE — CONSULT NOTE ADULT - ATTENDING COMMENTS
Patient was seen and examined ,interim events noted,Laboratory and Imaging studies were reviewed.  Thank you for the courtesy of the consultation,I would be available for any further discussion if needed.  Nic Quiros MD,FACC.  [address] [place] [address] [phone]

## 2019-12-11 ENCOUNTER — TRANSCRIPTION ENCOUNTER (OUTPATIENT)
Age: 84
End: 2019-12-11

## 2019-12-11 VITALS
HEART RATE: 81 BPM | RESPIRATION RATE: 19 BRPM | SYSTOLIC BLOOD PRESSURE: 127 MMHG | TEMPERATURE: 98 F | DIASTOLIC BLOOD PRESSURE: 71 MMHG | OXYGEN SATURATION: 99 %

## 2019-12-11 LAB
ALBUMIN SERPL ELPH-MCNC: 3.2 G/DL — LOW (ref 3.5–5)
ALP SERPL-CCNC: 45 U/L — SIGNIFICANT CHANGE UP (ref 40–120)
ALT FLD-CCNC: 64 U/L DA — HIGH (ref 10–60)
ANION GAP SERPL CALC-SCNC: 5 MMOL/L — SIGNIFICANT CHANGE UP (ref 5–17)
AST SERPL-CCNC: 30 U/L — SIGNIFICANT CHANGE UP (ref 10–40)
BILIRUB SERPL-MCNC: 0.4 MG/DL — SIGNIFICANT CHANGE UP (ref 0.2–1.2)
BUN SERPL-MCNC: 20 MG/DL — HIGH (ref 7–18)
CALCIUM SERPL-MCNC: 9.4 MG/DL — SIGNIFICANT CHANGE UP (ref 8.4–10.5)
CHLORIDE SERPL-SCNC: 107 MMOL/L — SIGNIFICANT CHANGE UP (ref 96–108)
CO2 SERPL-SCNC: 28 MMOL/L — SIGNIFICANT CHANGE UP (ref 22–31)
CREAT SERPL-MCNC: 0.94 MG/DL — SIGNIFICANT CHANGE UP (ref 0.5–1.3)
GLUCOSE SERPL-MCNC: 101 MG/DL — HIGH (ref 70–99)
HCT VFR BLD CALC: 37.8 % — SIGNIFICANT CHANGE UP (ref 34.5–45)
HGB BLD-MCNC: 11.8 G/DL — SIGNIFICANT CHANGE UP (ref 11.5–15.5)
MCHC RBC-ENTMCNC: 29.8 PG — SIGNIFICANT CHANGE UP (ref 27–34)
MCHC RBC-ENTMCNC: 31.2 GM/DL — LOW (ref 32–36)
MCV RBC AUTO: 95.5 FL — SIGNIFICANT CHANGE UP (ref 80–100)
NRBC # BLD: 0 /100 WBCS — SIGNIFICANT CHANGE UP (ref 0–0)
PLATELET # BLD AUTO: 299 K/UL — SIGNIFICANT CHANGE UP (ref 150–400)
POTASSIUM SERPL-MCNC: 4.3 MMOL/L — SIGNIFICANT CHANGE UP (ref 3.5–5.3)
POTASSIUM SERPL-SCNC: 4.3 MMOL/L — SIGNIFICANT CHANGE UP (ref 3.5–5.3)
PROT SERPL-MCNC: 6.4 G/DL — SIGNIFICANT CHANGE UP (ref 6–8.3)
RBC # BLD: 3.96 M/UL — SIGNIFICANT CHANGE UP (ref 3.8–5.2)
RBC # FLD: 14.5 % — SIGNIFICANT CHANGE UP (ref 10.3–14.5)
SODIUM SERPL-SCNC: 140 MMOL/L — SIGNIFICANT CHANGE UP (ref 135–145)
WBC # BLD: 8.08 K/UL — SIGNIFICANT CHANGE UP (ref 3.8–10.5)
WBC # FLD AUTO: 8.08 K/UL — SIGNIFICANT CHANGE UP (ref 3.8–10.5)

## 2019-12-11 PROCEDURE — 82607 VITAMIN B-12: CPT

## 2019-12-11 PROCEDURE — 99285 EMERGENCY DEPT VISIT HI MDM: CPT | Mod: 25

## 2019-12-11 PROCEDURE — 84484 ASSAY OF TROPONIN QUANT: CPT

## 2019-12-11 PROCEDURE — 93017 CV STRESS TEST TRACING ONLY: CPT

## 2019-12-11 PROCEDURE — 83880 ASSAY OF NATRIURETIC PEPTIDE: CPT

## 2019-12-11 PROCEDURE — 80053 COMPREHEN METABOLIC PANEL: CPT

## 2019-12-11 PROCEDURE — 83735 ASSAY OF MAGNESIUM: CPT

## 2019-12-11 PROCEDURE — 82746 ASSAY OF FOLIC ACID SERUM: CPT

## 2019-12-11 PROCEDURE — 84100 ASSAY OF PHOSPHORUS: CPT

## 2019-12-11 PROCEDURE — 36415 COLL VENOUS BLD VENIPUNCTURE: CPT

## 2019-12-11 PROCEDURE — 80061 LIPID PANEL: CPT

## 2019-12-11 PROCEDURE — A9502: CPT

## 2019-12-11 PROCEDURE — 78452 HT MUSCLE IMAGE SPECT MULT: CPT

## 2019-12-11 PROCEDURE — 82962 GLUCOSE BLOOD TEST: CPT

## 2019-12-11 PROCEDURE — 85027 COMPLETE CBC AUTOMATED: CPT

## 2019-12-11 PROCEDURE — 84443 ASSAY THYROID STIM HORMONE: CPT

## 2019-12-11 PROCEDURE — 71046 X-RAY EXAM CHEST 2 VIEWS: CPT

## 2019-12-11 PROCEDURE — 93005 ELECTROCARDIOGRAM TRACING: CPT

## 2019-12-11 PROCEDURE — 99239 HOSP IP/OBS DSCHRG MGMT >30: CPT | Mod: GC

## 2019-12-11 PROCEDURE — 83036 HEMOGLOBIN GLYCOSYLATED A1C: CPT

## 2019-12-11 PROCEDURE — 93306 TTE W/DOPPLER COMPLETE: CPT

## 2019-12-11 RX ORDER — LOSARTAN POTASSIUM 100 MG/1
1 TABLET, FILM COATED ORAL
Qty: 30 | Refills: 0
Start: 2019-12-11 | End: 2020-01-09

## 2019-12-11 RX ORDER — LOSARTAN POTASSIUM 100 MG/1
25 TABLET, FILM COATED ORAL DAILY
Refills: 0 | Status: DISCONTINUED | OUTPATIENT
Start: 2019-12-11 | End: 2019-12-11

## 2019-12-11 RX ADMIN — Medication 75 MICROGRAM(S): at 05:24

## 2019-12-11 RX ADMIN — AMLODIPINE BESYLATE 5 MILLIGRAM(S): 2.5 TABLET ORAL at 05:24

## 2019-12-11 RX ADMIN — ENOXAPARIN SODIUM 40 MILLIGRAM(S): 100 INJECTION SUBCUTANEOUS at 12:39

## 2019-12-11 RX ADMIN — Medication 81 MILLIGRAM(S): at 12:39

## 2019-12-11 NOTE — DISCHARGE NOTE NURSING/CASE MANAGEMENT/SOCIAL WORK - PATIENT PORTAL LINK FT
You can access the FollowMyHealth Patient Portal offered by [facility] by registering at the following website: [email or web address]. By joining [employer]’s FollowMyHealth portal, you will also be able to view your health information using other applications (apps) compatible with our system.

## 2019-12-11 NOTE — DISCHARGE NOTE PROVIDER - HOSPITAL COURSE
87 y/o female with PMHx of HTN, spinal stenosis and hypothyroidism presents to the ED with chief complaint of chest pain that began this morning. She describes the pain as constant, bilateral and improved at present. At its worst, she says it was 9/10 in severity. She is unable to provide further descriptives of the pain itself. She does say that she thinks its pleuritic. She also complaints of occasional shortness of breath even at rest. She has no other complaints at this time.        Admitted to telemetry to rule out ACS. Trops negative. EKG showed no acute changes. Stress test was negative. She was followed by cardiology and chest pain was found to be non cardiac.

## 2019-12-11 NOTE — DISCHARGE NOTE PROVIDER - NSDCMRMEDTOKEN_GEN_ALL_CORE_FT
Dyazide 37.5-25 mg oral capsule: 1 cap(s) orally once a day  losartan 100 mg oral tablet: 1 tab(s) orally once a day  Norvasc 5 mg oral tablet: 1 tab(s) orally once a day  Synthroid 75 mcg (0.075 mg) oral tablet: 1 tab(s) orally once a day losartan 25 mg oral tablet: 1 tab(s) orally once a day  Norvasc 5 mg oral tablet: 1 tab(s) orally once a day  Synthroid 75 mcg (0.075 mg) oral tablet: 1 tab(s) orally once a day

## 2019-12-11 NOTE — DISCHARGE NOTE PROVIDER - NSDCCPCAREPLAN_GEN_ALL_CORE_FT
PRINCIPAL DISCHARGE DIAGNOSIS  Diagnosis: Chest pain  Assessment and Plan of Treatment: You presented with chest pain and were admitted to ensure no cardiac cause. Your EKG showed normal sinus rhythm and your cardiac enzymes were negative when trended. You received an ECHO which showed your ejection fraction to be >55%. A stress test was done which showed no evidence of ischemia. At this time you have no evidence of cardiac ischemia.   Please follow up with your primary care provider. You have been started on aspirin, a statin, and a beta blocker to reduce your risk of heart disease. Please take your medications as prescribed.      SECONDARY DISCHARGE DIAGNOSES  Diagnosis: HTN (hypertension)  Assessment and Plan of Treatment: Continue with blood pressure medication. Maintain a healthy diet that consist of low sugar, low fat, low sodium diet. Exercise frequently if possible.  Follow up with primary care physician in one week after discharge.    Diagnosis: Hypothyroidism  Assessment and Plan of Treatment: Your medications were continued throughout your stay. Please follow up with your primary care provider to ensure continued optimal management. PRINCIPAL DISCHARGE DIAGNOSIS  Diagnosis: Chest pain  Assessment and Plan of Treatment: You presented with chest pain and were admitted to ensure no cardiac cause. Your EKG showed normal sinus rhythm and your cardiac enzymes were negative when trended. You received an ECHO which showed your ejection fraction to be >55%. A stress test was done which showed no evidence of ischemia. At this time you have no evidence of cardiac ischemia. Your chest pain may be related to anxiety. Please follow up with your primary care provider. Please take your medications as prescribed.      SECONDARY DISCHARGE DIAGNOSES  Diagnosis: HTN (hypertension)  Assessment and Plan of Treatment: Continue with blood pressure medication. Maintain a healthy diet that consist of low sugar, low fat, low sodium diet. Exercise frequently if possible.  Follow up with primary care physician in one week after discharge.    Diagnosis: Hypothyroidism  Assessment and Plan of Treatment: Your medications were continued throughout your stay. Please follow up with your primary care provider to ensure continued optimal management. PRINCIPAL DISCHARGE DIAGNOSIS  Diagnosis: Atypical chest pain  Assessment and Plan of Treatment: You presented with chest pain and were admitted to ensure no cardiac cause. Your EKG showed normal sinus rhythm and your cardiac enzymes were negative when trended. You received an ECHO which showed your ejection fraction to be  more than 55%. A stress test was done which showed no evidence of ischemia. At this time you have no evidence of cardiac ischemia. Your chest pain may be related to anxiety due to Psychosocial stressors.  Please follow up with your primary care provider. Please take your medications as prescribed.      SECONDARY DISCHARGE DIAGNOSES  Diagnosis: HTN (hypertension)  Assessment and Plan of Treatment: You have a history of Hypertension and was taking three BP medications. Your Blood pressure was well controlled during hospital stay in the 120 to 130 systolic range just with Amlodipine. We suggest and counseled you to hold off taking Triamterene-HCTZ completely as well as reducing dose of Losartan to 25 mg dialy (was on 100 mg daily before). Continue with Amlodipine 5 mg daily.    Maintain a healthy diet that consist of low sugar, low fat, low sodium diet. Exercise frequently if possible.  Follow up with primary care physician in one week after discharge. May increase dose of losartan if BP more than 140/80 mm Hg which is your target Blood pressure for your age.    Diagnosis: Hypothyroidism  Assessment and Plan of Treatment: Continue with Synthroid 75 microgm daily before breakfast. Your TSH levels noted to be with in normal limits.  Please follow up with your primary care provider to ensure continued optimal management.  Repeat TSH every 3 to 6 months

## 2019-12-11 NOTE — PROGRESS NOTE ADULT - SUBJECTIVE AND OBJECTIVE BOX
PGY1 note discussed with primary attending     OVERNIGHT EVENTS: +ve intermittent chest pain   For stress test today  No acute overnight events    REVIEW OF SYSTEMS:  CONSTITUTIONAL: No fever, chills  NECK: No pain or stiffness  RESPIRATORY: No cough, SOB  CARDIOVASCULAR: intermittent chest pain, no palpitations  GASTROINTESTINAL: No abdominal pain. No nausea, vomiting, or diarrhea  GENITOURINARY: No dysuria  NEUROLOGICAL: No HA  MUSCULOSKELETAL: No joint pain or swelling; No muscle, back, or extremity pain    Vital Signs Last 24 Hrs  T(C): 36.9 (11 Dec 2019 07:32), Max: 37.1 (11 Dec 2019 04:36)  T(F): 98.4 (11 Dec 2019 07:32), Max: 98.7 (11 Dec 2019 04:36)  HR: 73 (11 Dec 2019 07:32) (56 - 82)  BP: 143/63 (11 Dec 2019 07:32) (105/59 - 143/63)  BP(mean): 82 (10 Dec 2019 15:30) (82 - 82)  RR: 18 (11 Dec 2019 07:32) (17 - 18)  SpO2: 98% (11 Dec 2019 07:32) (98% - 100%)    MEDICATIONS  (STANDING):  amLODIPine   Tablet 5 milliGRAM(s) Oral daily  aspirin  chewable 81 milliGRAM(s) Oral daily  atorvastatin 40 milliGRAM(s) Oral at bedtime  enoxaparin Injectable 40 milliGRAM(s) SubCutaneous daily  levothyroxine 75 MICROGram(s) Oral daily    MEDICATIONS  (PRN):        PHYSICAL EXAM:  GENERAL: NAD  ENMT: Moist mucous membranes  NECK: Supple, No JVD  CHEST/LUNG: Clear to auscultation bilaterally; No rales, rhonchi, wheezing, or rubs  HEART: S1, S2, Regular rate and rhythm  ABDOMEN: Soft, Nontender, Nondistended; Bowel sounds present  NEURO: Alert & Oriented X3  EXTREMITIES: No LE edema, no calf tenderness    Consultant(s) Notes Reviewed:  [x ] YES  [ ] NO  Care Discussed with Consultants/Other Providers [ x] YES  [ ] NO    LABS:                                   11.8   8.08  )-----------( 299      ( 11 Dec 2019 06:41 )             37.8       12-11    140  |  107  |  20<H>  ----------------------------<  101<H>  4.3   |  28  |  0.94    Ca    9.4      11 Dec 2019 06:41  Phos  2.3     12-10  Mg     2.0     12-10    TPro  6.4  /  Alb  3.2<L>  /  TBili  0.4  /  DBili  x   /  AST  30  /  ALT  64<H>  /  AlkPhos  45  12-11          CAPILLARY BLOOD GLUCOSE      POCT Blood Glucose.: 105 mg/dL (09 Dec 2019 17:06)    RADIOLOGY & ADDITIONAL TESTS:    < from: Xray Chest 2 Views PA/Lat (12.09.19 @ 20:05) >    EXAM:  XR CHEST PA LAT 2V                            PROCEDURE DATE:  12/09/2019          INTERPRETATION:  INDICATION: Chest Pain    PRIORS: None    VIEWS: Frontal and lateral radiographs of the chest performed.    FINDINGS: Heart size appears within normal limits. No hilar or superior   mediastinal abnormalities are identified.    Arterial deficiency is suggested within the upper lung fields, likely   related to COPD/emphysema. There is no evidence for focal infiltrate,   lobar consolidation or pulmonary edema. No pleural effusion or   pneumothorax is demonstrated. No mediastinal shift is noted. Degenerative   changes of the thoracic spine noted.    IMPRESSION: No evidence for focal infiltrate or lobar consolidation.      < end of copied text >      Imaging Personally Reviewed:  [ ] YES  [ ] NO

## 2019-12-11 NOTE — PROGRESS NOTE ADULT - PROBLEM SELECTOR PLAN 1
-p/w atypical chest pain   -Trop x3 negative   -12 lead EKG with no acute changes   -cont Tele monitoring   -Cardio Dr. Quiros   -for stress test today

## 2019-12-11 NOTE — PROGRESS NOTE ADULT - ATTENDING COMMENTS
Patient seen and examined in afternoon Agree with PGY1 A/P above with editing as needed. My independent assessment, findings on exam, diagnosis and plan of care as listed below. Discussed with Dr. Ibrahim    Patient admitted with Chest pain, atypical resolved; Hx HTN,    Vital Signs Last 24 Hrs  T(C): 36.8 (11 Dec 2019 15:43), Max: 36.9 (11 Dec 2019 07:32)  T(F): 98.2 (11 Dec 2019 15:43), Max: 98.5 (11 Dec 2019 11:26)  HR: 81 (11 Dec 2019 15:43) (69 - 81)  BP: 127/71 (11 Dec 2019 15:43) (118/62 - 143/63)  RR: 19 (11 Dec 2019 15:43) (18 - 19)  SpO2: 99% (11 Dec 2019 15:43) (98% - 100%)    P/E:  Neuro: AAO x3, No focal deficits  Gait: Well balanced  CVS: S1S2 present  Resp: BLAE+, NO wheeze or Rhonchi  GI: Soft, Obese, BS+, NT  Extr: No edema    Labs:                        11.8   8.08  )-----------( 299      ( 11 Dec 2019 06:41 )             37.8   12-11    140  |  107  |  20<H>  ----------------------------<  101<H>  4.3   |  28  |  0.94    Ca    9.4      11 Dec 2019 06:41  Phos  2.3     12-10  Mg     2.0     12-10    TPro  6.4  /  Alb  3.2<L>  /  TBili  0.4  /  DBili  x   /  AST  30  /  ALT  64<H>  /  AlkPhos  45  12-11    Thyroid Stimulating Hormone, Serum (12.10.19 @ 07:51)    Thyroid Stimulating Hormone, Serum: 2.28 uU/mL  Hemoglobin A1C, Whole Blood (12.10.19 @ 09:37)    Hemoglobin A1C, Whole Blood: 5.9: Method: Immunoassay  Lipid Profile (12.10.19 @ 07:51)    Total Cholesterol/HDL Ratio Measurement: 2.2 RATIO    Cholesterol, Serum: 173 mg/dL    Triglycerides, Serum: 79 mg/dL    HDL Cholesterol, Serum: 79:    Nuclear Stress Test-Pharmacologic (12.11.19 @ 02:21)     IMPRESSIONS:  * There is a small, partially reversible, mild intensity defect in the inferoapical wall that thickens, consistent with attenuation artifact.  * Post-stress resting myocardial perfusion gated SPECT imaging was performed (LVEF > 70%;LVEDV = 67 ml.)  * Negative study for reversible ischemia    < from: Transthoracic Echocardiogram (12.10.19 @ 07:23) >    CONCLUSIONS:  1. Normal mitral valve.  2. Normal trileaflet aortic valve.  3. Aortic Root: 2.9 cm.  4. Normal left atrium.  LA volume index = 18 cc/m2.  5. Normal left ventricular internal dimensions and wall thicknesses.  6. Normal Left Ventricular Systolic Function,  (EF = 55 to 60%)  7. Grade I diastolic dysfunction (Impaired relaxation).  8. Normal right atrium.  9. Normal right ventricular size and systolic function (TAPSE  2.0cm).  10. Unable to estimate RVSP.  11. Normal tricuspid valve.  12. Normal pulmonic valve.  13. Normal pericardium with no pericardial effusion.    D/D:  Atypical chest pain negative for ACS/ ischemia  Hx HTN well controlled   Anxiety due to Psychosocial stressors    Plan:  Patient medically stable for d/c home  Discussed with Patient BP controlled wioth only one agent Amlodipine and just resuming Losartan at low dose only 25 mg  No TriamtereneHCTZ for now  Follow up with Dr. Nuno PCP next week and monitor BP and adjust meds    See discharge note updated for details

## 2019-12-11 NOTE — DISCHARGE NOTE PROVIDER - CARE PROVIDER_API CALL
Roberto Carlos Nuno; MBBS)  Internal Medicine  [address] [address] [place] [address]  Phone: [phone]  Fax: [phone]  Follow Up Time: 2 weeks

## 2021-09-09 NOTE — ED PROVIDER NOTE - CADM POA CENTRAL LINE
----- Message from Mohamud Mccallum MD sent at 9/6/2021 12:59 PM CDT -----  Regarding: 3.4 sec pause at 3:45 a.m. 09/06/2021  Patient of Dr. Pritchetts, received notification from [employer] about pause.  They will fax trip to the office.  Thanks.    
Asuncion BRUCE Addressed with Dr. Hassan  
No

## 2022-05-05 ENCOUNTER — OUTPATIENT (OUTPATIENT)
Dept: OUTPATIENT SERVICES | Facility: HOSPITAL | Age: 87
LOS: 1 days | End: 2022-05-05
Payer: MEDICARE

## 2022-05-05 DIAGNOSIS — R06.00 DYSPNEA, UNSPECIFIED: ICD-10-CM

## 2022-05-05 PROBLEM — E78.5 HYPERLIPIDEMIA, UNSPECIFIED: Chronic | Status: ACTIVE | Noted: 2019-12-11

## 2022-05-05 PROCEDURE — 78452 HT MUSCLE IMAGE SPECT MULT: CPT | Mod: 26

## 2022-05-05 PROCEDURE — 93018 CV STRESS TEST I&R ONLY: CPT

## 2022-05-05 PROCEDURE — 93016 CV STRESS TEST SUPVJ ONLY: CPT

## 2022-05-05 PROCEDURE — 93306 TTE W/DOPPLER COMPLETE: CPT | Mod: 26

## 2022-08-31 ENCOUNTER — EMERGENCY (EMERGENCY)
Facility: HOSPITAL | Age: 87
LOS: 1 days | Discharge: HOME | End: 2022-08-31
Attending: EMERGENCY MEDICINE
Payer: COMMERCIAL

## 2022-08-31 VITALS
HEART RATE: 97 BPM | TEMPERATURE: 98 F | DIASTOLIC BLOOD PRESSURE: 79 MMHG | RESPIRATION RATE: 22 BRPM | WEIGHT: 138.89 LBS | OXYGEN SATURATION: 94 % | SYSTOLIC BLOOD PRESSURE: 149 MMHG

## 2022-08-31 PROCEDURE — 99284 EMERGENCY DEPT VISIT MOD MDM: CPT

## 2022-08-31 NOTE — ED ADULT TRIAGE NOTE - BP NONINVASIVE SYSTOLIC (MM HG)
149 Ftsg Text: The defect edges were debeveled with a #15c scalpel blade.  Given the location of the defect, shape of the defect and the proximity to free margins a full thickness skin graft was deemed most appropriate.  Using a sterile surgical marker, the primary defect shape was transferred to the donor site. The area thus outlined was incised deep to adipose tissue with a #15c scalpel blade.  The harvested graft was then trimmed of adipose tissue until only dermis and epidermis was left.  The skin margins of the secondary defect were undermined to an appropriate distance in all directions utilizing iris scissors.  The secondary defect was closed with interrupted buried subcutaneous sutures.  The skin edges were then re-apposed with running  sutures.  The skin graft was then placed in the primary defect and oriented appropriately.

## 2022-09-01 VITALS
TEMPERATURE: 98 F | RESPIRATION RATE: 18 BRPM | OXYGEN SATURATION: 97 % | HEART RATE: 59 BPM | SYSTOLIC BLOOD PRESSURE: 123 MMHG | DIASTOLIC BLOOD PRESSURE: 65 MMHG

## 2022-09-01 LAB
ALBUMIN SERPL ELPH-MCNC: 3.5 G/DL — SIGNIFICANT CHANGE UP (ref 3.5–5)
ALP SERPL-CCNC: 65 U/L — SIGNIFICANT CHANGE UP (ref 40–120)
ALT FLD-CCNC: 18 U/L DA — SIGNIFICANT CHANGE UP (ref 10–60)
ANION GAP SERPL CALC-SCNC: 8 MMOL/L — SIGNIFICANT CHANGE UP (ref 5–17)
AST SERPL-CCNC: 23 U/L — SIGNIFICANT CHANGE UP (ref 10–40)
BASOPHILS # BLD AUTO: 0.06 K/UL — SIGNIFICANT CHANGE UP (ref 0–0.2)
BASOPHILS NFR BLD AUTO: 0.8 % — SIGNIFICANT CHANGE UP (ref 0–2)
BILIRUB SERPL-MCNC: 0.3 MG/DL — SIGNIFICANT CHANGE UP (ref 0.2–1.2)
BUN SERPL-MCNC: 12 MG/DL — SIGNIFICANT CHANGE UP (ref 7–18)
CALCIUM SERPL-MCNC: 9.6 MG/DL — SIGNIFICANT CHANGE UP (ref 8.4–10.5)
CHLORIDE SERPL-SCNC: 106 MMOL/L — SIGNIFICANT CHANGE UP (ref 96–108)
CO2 SERPL-SCNC: 25 MMOL/L — SIGNIFICANT CHANGE UP (ref 22–31)
CREAT SERPL-MCNC: 0.85 MG/DL — SIGNIFICANT CHANGE UP (ref 0.5–1.3)
EGFR: 65 ML/MIN/1.73M2 — SIGNIFICANT CHANGE UP
EOSINOPHIL # BLD AUTO: 0.23 K/UL — SIGNIFICANT CHANGE UP (ref 0–0.5)
EOSINOPHIL NFR BLD AUTO: 2.9 % — SIGNIFICANT CHANGE UP (ref 0–6)
GLUCOSE SERPL-MCNC: 116 MG/DL — HIGH (ref 70–99)
HCT VFR BLD CALC: 40 % — SIGNIFICANT CHANGE UP (ref 34.5–45)
HGB BLD-MCNC: 12.7 G/DL — SIGNIFICANT CHANGE UP (ref 11.5–15.5)
IMM GRANULOCYTES NFR BLD AUTO: 0.4 % — SIGNIFICANT CHANGE UP (ref 0–1.5)
LYMPHOCYTES # BLD AUTO: 1.37 K/UL — SIGNIFICANT CHANGE UP (ref 1–3.3)
LYMPHOCYTES # BLD AUTO: 17.2 % — SIGNIFICANT CHANGE UP (ref 13–44)
MAGNESIUM SERPL-MCNC: 1.9 MG/DL — SIGNIFICANT CHANGE UP (ref 1.6–2.6)
MCHC RBC-ENTMCNC: 30.8 PG — SIGNIFICANT CHANGE UP (ref 27–34)
MCHC RBC-ENTMCNC: 31.8 GM/DL — LOW (ref 32–36)
MCV RBC AUTO: 96.9 FL — SIGNIFICANT CHANGE UP (ref 80–100)
MONOCYTES # BLD AUTO: 0.62 K/UL — SIGNIFICANT CHANGE UP (ref 0–0.9)
MONOCYTES NFR BLD AUTO: 7.8 % — SIGNIFICANT CHANGE UP (ref 2–14)
NEUTROPHILS # BLD AUTO: 5.65 K/UL — SIGNIFICANT CHANGE UP (ref 1.8–7.4)
NEUTROPHILS NFR BLD AUTO: 70.9 % — SIGNIFICANT CHANGE UP (ref 43–77)
NRBC # BLD: 0 /100 WBCS — SIGNIFICANT CHANGE UP (ref 0–0)
NT-PROBNP SERPL-SCNC: 81 PG/ML — SIGNIFICANT CHANGE UP (ref 0–450)
PLATELET # BLD AUTO: 276 K/UL — SIGNIFICANT CHANGE UP (ref 150–400)
POTASSIUM SERPL-MCNC: 5.1 MMOL/L — SIGNIFICANT CHANGE UP (ref 3.5–5.3)
POTASSIUM SERPL-SCNC: 5.1 MMOL/L — SIGNIFICANT CHANGE UP (ref 3.5–5.3)
PROT SERPL-MCNC: 7 G/DL — SIGNIFICANT CHANGE UP (ref 6–8.3)
RBC # BLD: 4.13 M/UL — SIGNIFICANT CHANGE UP (ref 3.8–5.2)
RBC # FLD: 14.6 % — HIGH (ref 10.3–14.5)
SODIUM SERPL-SCNC: 139 MMOL/L — SIGNIFICANT CHANGE UP (ref 135–145)
TROPONIN I, HIGH SENSITIVITY RESULT: 34.4 NG/L — SIGNIFICANT CHANGE UP
TROPONIN I, HIGH SENSITIVITY RESULT: 35.9 NG/L — SIGNIFICANT CHANGE UP
WBC # BLD: 7.96 K/UL — SIGNIFICANT CHANGE UP (ref 3.8–10.5)
WBC # FLD AUTO: 7.96 K/UL — SIGNIFICANT CHANGE UP (ref 3.8–10.5)

## 2022-09-01 PROCEDURE — 71045 X-RAY EXAM CHEST 1 VIEW: CPT | Mod: 26

## 2022-09-01 PROCEDURE — 80053 COMPREHEN METABOLIC PANEL: CPT

## 2022-09-01 PROCEDURE — 84484 ASSAY OF TROPONIN QUANT: CPT

## 2022-09-01 PROCEDURE — 36415 COLL VENOUS BLD VENIPUNCTURE: CPT

## 2022-09-01 PROCEDURE — 99285 EMERGENCY DEPT VISIT HI MDM: CPT | Mod: 25

## 2022-09-01 PROCEDURE — 93005 ELECTROCARDIOGRAM TRACING: CPT

## 2022-09-01 PROCEDURE — 83735 ASSAY OF MAGNESIUM: CPT

## 2022-09-01 PROCEDURE — 71045 X-RAY EXAM CHEST 1 VIEW: CPT

## 2022-09-01 PROCEDURE — 83880 ASSAY OF NATRIURETIC PEPTIDE: CPT

## 2022-09-01 PROCEDURE — 85025 COMPLETE CBC W/AUTO DIFF WBC: CPT

## 2022-09-01 NOTE — ED PROVIDER NOTE - PATIENT PORTAL LINK FT
You can access the FollowMyHealth Patient Portal offered by [facility] by registering at the following website: [email or web address]. By joining [facility]’s FollowMyHealth portal, you will also be able to view your health information using other applications (apps) compatible with our system.

## 2022-09-01 NOTE — ED PROVIDER NOTE - PROGRESS NOTE DETAILS
labs are unremarkable. ecg unchanged from prior. cxr clear. will repeat trop. repeat trop negative. pt continues to feel well. recent stress/echo were normal. will dc. f/u with cardiology. return precautions discussed.

## 2022-09-01 NOTE — ED PROVIDER NOTE - NSFOLLOWUPINSTRUCTIONS_ED_ALL_ED_FT
[facility] Hospital[facility]SpanishTagalogTraditional ChineseVietnamese                                                                                                                      Shortness of Breath, Adult      Shortness of breath is when a person has trouble breathing enough air or when a person feels like she or he is having trouble breathing in enough air. Shortness of breath could be a sign of a medical problem.      Follow these instructions at home:     •Pay attention to any changes in your symptoms.      • Do not use any products that contain nicotine or tobacco, such as cigarettes, e-cigarettes, and chewing tobacco.       • Do not smoke. Smoking is a common cause of shortness of breath. If you need help quitting, ask your health care provider.    •Avoid things that can irritate your airways, such as:  •Mold.      •Dust.      •Air pollution.      •Chemical fumes.      •Things that can cause allergy symptoms (allergens), if you have allergies.        •Keep your living space clean and free of mold and dust.      •Rest as needed. Slowly return to your usual activities.      •Take over-the-counter and prescription medicines only as told by your health care provider. This includes oxygen therapy and inhaled medicines.      •Keep all follow-up visits as told by your health care provider. This is important.        Contact a health care provider if:    •Your condition does not improve as soon as expected.      •You have a hard time doing your normal activities, even after you rest.      •You have new symptoms.        Get help right away if:    •Your shortness of breath gets worse.      •You have shortness of breath when you are resting.      •You feel light-headed or you faint.      •You have a cough that is not controlled with medicines.      •You cough up blood.      •You have pain with breathing.      •You have pain in your chest, arms, shoulders, or abdomen.      •You have a fever.      •You cannot walk up stairs or exercise the way that you normally do.      These symptoms may represent a serious problem that is an emergency. Do not wait to see if the symptoms will go away. Get medical help right away. Call your local emergency services (911 in the [place] Do not drive yourself to the hospital.       Summary    •Shortness of breath is when a person has trouble breathing enough air. It can be a sign of a medical problem.      •Avoid things that irritate your lungs, such as smoking, pollution, mold, and dust.      •Pay attention to changes in your symptoms and contact your health care provider if you have a hard time completing daily activities because of shortness of breath.      This information is not intended to replace advice given to you by your health care provider. Make sure you discuss any questions you have with your health care provider.      Document Revised: 05/20/2019 Document Reviewed: 05/20/2019    Elsevier Patient Education © 2022 [employer]

## 2022-09-01 NOTE — ED PROVIDER NOTE - OBJECTIVE STATEMENT
89 year old female PMH HTN, hypothyroid coming in for SOB and chest tightness. pt states she was going to go to sleep when she started to feel like she couldn't take a deep breath in. states last for about an hour and then she called her son and came to the ED. states this happened once before in 2019 and workup was normal. states she did see her cardiologist in may 2022 and had a stress test and echo which were normal. at this time states symptoms have nearly completely gone away. denies all other complaints at this time. does states that her son was murdered a year ago and the trial is coming up so thought it could possibly be from stress.

## 2023-09-22 ENCOUNTER — NON-APPOINTMENT (OUTPATIENT)
Age: 88
End: 2023-09-22

## 2023-09-22 ENCOUNTER — APPOINTMENT (OUTPATIENT)
Dept: CARDIOLOGY | Facility: CLINIC | Age: 88
End: 2023-09-22
Payer: MEDICARE

## 2023-09-22 VITALS
OXYGEN SATURATION: 99 % | WEIGHT: 138 LBS | SYSTOLIC BLOOD PRESSURE: 156 MMHG | HEART RATE: 84 BPM | DIASTOLIC BLOOD PRESSURE: 78 MMHG

## 2023-09-22 DIAGNOSIS — Z00.00 ENCOUNTER FOR GENERAL ADULT MEDICAL EXAMINATION W/OUT ABNORMAL FINDINGS: ICD-10-CM

## 2023-09-22 PROCEDURE — 99203 OFFICE O/P NEW LOW 30 MIN: CPT | Mod: 25

## 2023-09-22 PROCEDURE — 93000 ELECTROCARDIOGRAM COMPLETE: CPT

## 2023-09-23 RX ORDER — AMLODIPINE BESYLATE 10 MG/1
10 TABLET ORAL
Qty: 30 | Refills: 5 | Status: ACTIVE | COMMUNITY
Start: 2023-09-23

## 2023-09-23 RX ORDER — LEVOTHYROXINE SODIUM 75 UG/1
75 TABLET ORAL
Qty: 90 | Refills: 0 | Status: ACTIVE | COMMUNITY
Start: 2023-09-23

## 2023-09-23 RX ORDER — ATORVASTATIN CALCIUM 10 MG/1
10 TABLET, FILM COATED ORAL
Qty: 30 | Refills: 1 | Status: ACTIVE | COMMUNITY
Start: 2023-09-23

## 2023-09-23 RX ORDER — LOSARTAN POTASSIUM 100 MG/1
100 TABLET, FILM COATED ORAL
Qty: 90 | Refills: 0 | Status: ACTIVE | COMMUNITY
Start: 2023-09-23

## 2023-09-23 RX ORDER — ESCITALOPRAM OXALATE 10 MG/1
10 TABLET, FILM COATED ORAL
Refills: 0 | Status: ACTIVE | COMMUNITY
Start: 2023-09-23

## 2024-12-13 ENCOUNTER — APPOINTMENT (OUTPATIENT)
Dept: CARDIOLOGY | Facility: CLINIC | Age: 88
End: 2024-12-13

## 2024-12-13 ENCOUNTER — NON-APPOINTMENT (OUTPATIENT)
Age: 88
End: 2024-12-13

## 2024-12-13 VITALS
DIASTOLIC BLOOD PRESSURE: 76 MMHG | BODY MASS INDEX: 27.48 KG/M2 | HEIGHT: 60 IN | HEART RATE: 89 BPM | SYSTOLIC BLOOD PRESSURE: 140 MMHG | WEIGHT: 140 LBS

## 2024-12-13 DIAGNOSIS — I10 ESSENTIAL (PRIMARY) HYPERTENSION: ICD-10-CM

## 2024-12-13 DIAGNOSIS — R53.82 CHRONIC FATIGUE, UNSPECIFIED: ICD-10-CM

## 2024-12-13 PROCEDURE — G2211 COMPLEX E/M VISIT ADD ON: CPT

## 2024-12-13 PROCEDURE — 93000 ELECTROCARDIOGRAM COMPLETE: CPT

## 2024-12-13 PROCEDURE — 99215 OFFICE O/P EST HI 40 MIN: CPT

## 2024-12-13 RX ORDER — METOPROLOL SUCCINATE 25 MG/1
25 TABLET, EXTENDED RELEASE ORAL
Qty: 30 | Refills: 5 | Status: ACTIVE | COMMUNITY
Start: 2024-12-13

## 2024-12-16 PROBLEM — R53.82 CHRONIC FATIGUE: Status: ACTIVE | Noted: 2024-12-16

## 2024-12-21 ENCOUNTER — NON-APPOINTMENT (OUTPATIENT)
Age: 88
End: 2024-12-21